# Patient Record
Sex: MALE | Race: WHITE | NOT HISPANIC OR LATINO | URBAN - METROPOLITAN AREA
[De-identification: names, ages, dates, MRNs, and addresses within clinical notes are randomized per-mention and may not be internally consistent; named-entity substitution may affect disease eponyms.]

---

## 2021-04-13 NOTE — PATIENT DISCUSSION
POAG, OU:  INTRAOCULAR PRESSURE IS WITHIN ACCEPTABLE LIMITS. PATIENT INSTRUCTED TO CONTINUE __LATANOPROST__ AND RETURN FOR FOLLOW-UP AS SCHEDULED.

## 2021-05-05 NOTE — PATIENT DISCUSSION
CATARACTS, OU - WORSENING/VISUALLY SIGNIFICANT. SCHEDULE _OD_ FIRST THEN LATER IN _OS_ DISCUSSED OPTION OF __STANDARD KAHOOK OU__VS ___STANDARD/LENSXKAHOOK OU__. PATIENT UNDERSTANDS AND DESIRES ___TO LET US KNOW____.

## 2021-05-05 NOTE — PATIENT DISCUSSION
Surgery Counseling: I have discussed the option of scheduling surgery versus following, as well as the risks, benefits and alternatives of cataract surgery with the patient. It was explained that the surgery is medically indicated at this time, and it can be performed at the patient's option as delaying will cause no further deterioration, therefore there is no rush and there is no harm in waiting to have surgery. It was also explained that there is no guarantee that removing the cataract will improve their vision. The patient understands and desires to proceed with cataract surgery with the implantation of an intraocular lens to improve vision for __DRIVING _____. I have given the patient the prescribed regimen of the all-in-one drop to use before and after cataract surgery. They have elected to use the all-in-one option of Pred/Gati/Brom(prednisolone acetate,gatifloxacin,and bromfenac. Patient to administer as directed.

## 2022-07-09 ENCOUNTER — TELEPHONE ENCOUNTER (OUTPATIENT)
Dept: URBAN - METROPOLITAN AREA CLINIC 121 | Facility: CLINIC | Age: 57
End: 2022-07-09

## 2022-07-10 ENCOUNTER — TELEPHONE ENCOUNTER (OUTPATIENT)
Dept: URBAN - METROPOLITAN AREA CLINIC 121 | Facility: CLINIC | Age: 57
End: 2022-07-10

## 2022-08-31 ENCOUNTER — COMPREHENSIVE EXAM (OUTPATIENT)
Dept: URBAN - METROPOLITAN AREA CLINIC 28 | Facility: CLINIC | Age: 57
End: 2022-08-31

## 2022-08-31 DIAGNOSIS — Q14.1: ICD-10-CM

## 2022-08-31 DIAGNOSIS — H25.013: ICD-10-CM

## 2022-08-31 DIAGNOSIS — H52.03: ICD-10-CM

## 2022-08-31 DIAGNOSIS — H43.812: ICD-10-CM

## 2022-08-31 DIAGNOSIS — H50.10: ICD-10-CM

## 2022-08-31 PROCEDURE — 92310-1 LEVEL 1 CONTACT LENS MANAGEMENT

## 2022-08-31 PROCEDURE — 92014 COMPRE OPH EXAM EST PT 1/>: CPT

## 2022-08-31 PROCEDURE — 92015 DETERMINE REFRACTIVE STATE: CPT

## 2022-08-31 ASSESSMENT — KERATOMETRY
OD_AXISANGLE2_DEGREES: 85
OS_K2POWER_DIOPTERS: 42.25
OD_K1POWER_DIOPTERS: 43.50
OS_AXISANGLE_DEGREES: 9
OS_K1POWER_DIOPTERS: 43.25
OD_AXISANGLE_DEGREES: 175
OD_K2POWER_DIOPTERS: 42.50
OS_AXISANGLE2_DEGREES: 99

## 2022-08-31 ASSESSMENT — VISUAL ACUITY
OD_CC: J1+3
OS_CC: J2
OD_CC: 20/20
OS_CC: 20/20

## 2022-08-31 ASSESSMENT — TONOMETRY
OS_IOP_MMHG: 16
OD_IOP_MMHG: 14

## 2022-12-07 ENCOUNTER — CONTACT LENSES/GLASSES VISIT (OUTPATIENT)
Dept: URBAN - METROPOLITAN AREA CLINIC 28 | Facility: CLINIC | Age: 57
End: 2022-12-07

## 2022-12-07 PROCEDURE — 92310F

## 2022-12-07 ASSESSMENT — KERATOMETRY
OS_K1POWER_DIOPTERS: 43
OD_AXISANGLE2_DEGREES: 82
OD_K2POWER_DIOPTERS: 42.50
OS_AXISANGLE2_DEGREES: 96
OD_K1POWER_DIOPTERS: 43.50
OS_AXISANGLE_DEGREES: 6
OS_K2POWER_DIOPTERS: 41.75
OD_AXISANGLE_DEGREES: 172

## 2023-05-24 ENCOUNTER — CONTACT LENSES/GLASSES VISIT (OUTPATIENT)
Dept: URBAN - METROPOLITAN AREA CLINIC 28 | Facility: CLINIC | Age: 58
End: 2023-05-24

## 2023-05-24 DIAGNOSIS — H52.03: ICD-10-CM

## 2023-05-24 PROCEDURE — 92310F

## 2023-05-24 ASSESSMENT — KERATOMETRY
OS_AXISANGLE_DEGREES: 6
OD_AXISANGLE_DEGREES: 172
OS_K1POWER_DIOPTERS: 43
OD_K1POWER_DIOPTERS: 43.50
OS_AXISANGLE2_DEGREES: 96
OS_K2POWER_DIOPTERS: 41.75
OD_AXISANGLE2_DEGREES: 82
OD_K2POWER_DIOPTERS: 42.50